# Patient Record
Sex: MALE | Race: WHITE | NOT HISPANIC OR LATINO
[De-identification: names, ages, dates, MRNs, and addresses within clinical notes are randomized per-mention and may not be internally consistent; named-entity substitution may affect disease eponyms.]

---

## 2023-12-14 ENCOUNTER — NON-APPOINTMENT (OUTPATIENT)
Age: 33
End: 2023-12-14

## 2024-06-02 ENCOUNTER — EMERGENCY (EMERGENCY)
Facility: HOSPITAL | Age: 34
LOS: 1 days | Discharge: HOME | End: 2024-06-02
Attending: STUDENT IN AN ORGANIZED HEALTH CARE EDUCATION/TRAINING PROGRAM | Admitting: STUDENT IN AN ORGANIZED HEALTH CARE EDUCATION/TRAINING PROGRAM
Payer: COMMERCIAL

## 2024-06-02 VITALS
DIASTOLIC BLOOD PRESSURE: 83 MMHG | WEIGHT: 229.94 LBS | HEART RATE: 111 BPM | OXYGEN SATURATION: 98 % | TEMPERATURE: 98 F | SYSTOLIC BLOOD PRESSURE: 138 MMHG | RESPIRATION RATE: 17 BRPM | HEIGHT: 72 IN

## 2024-06-02 PROCEDURE — 12004 RPR S/N/AX/GEN/TRK7.6-12.5CM: CPT

## 2024-06-02 PROCEDURE — 73610 X-RAY EXAM OF ANKLE: CPT

## 2024-06-02 PROCEDURE — 99283 EMERGENCY DEPT VISIT LOW MDM: CPT | Mod: 25

## 2024-06-02 PROCEDURE — 90471 IMMUNIZATION ADMIN: CPT

## 2024-06-02 PROCEDURE — 90715 TDAP VACCINE 7 YRS/> IM: CPT

## 2024-06-02 PROCEDURE — 73610 X-RAY EXAM OF ANKLE: CPT | Mod: 26,RT

## 2024-06-02 PROCEDURE — 99284 EMERGENCY DEPT VISIT MOD MDM: CPT | Mod: 25

## 2024-06-02 RX ORDER — LIDOCAINE HYDROCHLORIDE AND EPINEPHRINE 10; 10 MG/ML; UG/ML
10 INJECTION, SOLUTION INFILTRATION; PERINEURAL ONCE
Refills: 0 | Status: COMPLETED | OUTPATIENT
Start: 2024-06-02 | End: 2024-06-02

## 2024-06-02 RX ORDER — TETANUS TOXOID, REDUCED DIPHTHERIA TOXOID AND ACELLULAR PERTUSSIS VACCINE, ADSORBED 5; 2.5; 8; 8; 2.5 [IU]/.5ML; [IU]/.5ML; UG/.5ML; UG/.5ML; UG/.5ML
0.5 SUSPENSION INTRAMUSCULAR ONCE
Refills: 0 | Status: COMPLETED | OUTPATIENT
Start: 2024-06-02 | End: 2024-06-02

## 2024-06-02 RX ORDER — IBUPROFEN 200 MG
600 TABLET ORAL ONCE
Refills: 0 | Status: COMPLETED | OUTPATIENT
Start: 2024-06-02 | End: 2024-06-02

## 2024-06-02 RX ADMIN — Medication 600 MILLIGRAM(S): at 18:06

## 2024-06-02 RX ADMIN — LIDOCAINE HYDROCHLORIDE AND EPINEPHRINE 10 MILLILITER(S): 10; 10 INJECTION, SOLUTION INFILTRATION; PERINEURAL at 18:14

## 2024-06-02 RX ADMIN — TETANUS TOXOID, REDUCED DIPHTHERIA TOXOID AND ACELLULAR PERTUSSIS VACCINE, ADSORBED 0.5 MILLILITER(S): 5; 2.5; 8; 8; 2.5 SUSPENSION INTRAMUSCULAR at 18:06

## 2024-06-02 RX ADMIN — Medication 1 TABLET(S): at 18:06

## 2024-06-02 NOTE — ED PROVIDER NOTE - PATIENT PORTAL LINK FT
You can access the FollowMyHealth Patient Portal offered by [facility] by registering at the following website: [email or web address]. By joining [employer]’s FollowMyHealth portal, you will also be able to view your health information using other applications (apps) compatible with our system.

## 2024-06-02 NOTE — ED PROVIDER NOTE - NSFOLLOWUPINSTRUCTIONS_ED_ALL_ED_FT
Chart(s)/Patient Keep dry x 24 hours then gently cleanse.  Do not submerge under water.  Use crutches to keep weight off right foot.  Rest. Ice. Compression. Elevation.  Take bactrim as prescribed.  Return in 10-14 days for suture removal, sooner for worsening or concerning symptoms.      Laceration Care, Adult  A laceration is a cut that may go through all layers of the skin and into the tissue that is right under the skin. Some lacerations heal on their own. Others need to be closed with stitches (sutures), staples, skin adhesive strips, or skin glue.    Proper care of a laceration reduces the risk for infection, helps the laceration heal better, and may prevent scarring.    General tips  Keep the wound clean and dry.  Do not scratch or pick at the wound.  Wash your hands with soap and water for at least 20 seconds before and after touching your wound or changing your bandage (dressing). If soap and water are not available, use hand .  Do not usedisinfectants or antiseptics, such as rubbing alcohol, to clean your wound unless told by your health care provider.  If you were given a dressing, you should change it at least once a day, or as told by your health care provider. You should also change it if it becomes wet or dirty.  How to care for your laceration  If sutures or staples were used:    Keep the wound completely dry for the first 24 hours, or as told by your health care provider. After that time, you may shower or bathe. Do not soak your wound in water until after the sutures or staples have been removed.  Clean the wound once each day, or as told by your health care provider. To do this:  Wash the wound with soap and water.  Rinse the wound with water to remove all soap.  Pat the wound dry with a clean towel. Do not rub the wound.  After cleaning the wound, apply a thin layer of antibiotic ointment, other topical ointments, or a non-adherent dressing as told by your health care provider. This will help prevent infection and keep the dressing from sticking to the wound.  Have the sutures or staples removed as told by your health care provider. Do not remove sutures or staples yourself.  If skin adhesive strips were used:    Do not get the skin adhesive strips wet. You may shower or bathe, but keep the wound dry.  If the wound gets wet, pat it dry with a clean towel. Do not rub the wound.  Skin adhesive strips fall off on their own. If adhesive strip edges start to loosen and curl up, you may trim the loose edges. Do not remove adhesive strips completely unless your health care provider tells you to do that.  If skin glue was used:    You may shower or bathe, but try to keep the wound dry. Do not soak the wound in water.  After showering or bathing, pat the wound dry with a clean towel. Do not rub the wound.  Do not do any activities that will make you sweat a lot until the skin glue has fallen off.  Do not apply liquid, cream, or ointment medicine to the wound while the skin glue is in place. Doing this may loosen the film before the wound has healed.  If a dressing is placed over the wound, do not apply tape directly over the skin glue. Doing this may cause the glue to be pulled off before the wound has healed.  Do not pick at the glue. Skin glue usually remains in place for 5–10 days and then falls off the skin.  Follow these instructions at home:  Medicines    Take over-the-counter and prescription medicines only as told by your health care provider.  If you were prescribed an antibiotic medicine or ointment, take or apply it as told by your health care provider. Do not stop using it even if your condition improves.  Managing pain and swelling    If directed, put ice on the injured area. To do this:  Put ice in a plastic bag.  Place a towel between your skin and the bag.  Leave the ice on for 20 minutes, 2–3 times a day.  Remove the ice if your skin turns bright red. This is very important. If you cannot feel pain, heat, or cold, you have a greater risk of damage to the area.  Raise (elevate) the injured area above the level of your heart while you are sitting or lying down for the first 24–48 hours after the laceration is repaired.  General instructions    Two wounds closed with skin glue. One is normal. The other is red with pus and infected.  Avoid any activity that could cause your wound to reopen.  Check your wound every day for signs of infection. Watch for:  More redness, swelling, or pain.  Fluid or blood.  Warmth.  Pus or a bad smescobar.  Keep all follow-up visits. This is important.  Contact a health care provider if:  You received a tetanus shot and you have swelling, severe pain, redness, or bleeding at the injection site.  Your closed wound breaks open.  You have any of these signs of infection:  More redness, swelling, or pain around your wound.  Fluid or blood coming from your wound.  Warmth coming from your wound.  Pus or a bad smell coming from your wound.  A fever.  You notice something coming out of the wound, such as wood or glass.  Your pain is not controlled with medicine.  You notice a change in the color of your skin near your wound.  You need to change the dressing often.  You develop a new rash.  You have numbness around the wound.  Get help right away if:  You develop severe swelling around the wound.  Your pain suddenly increases and is severe.  You develop painful lumps near the wound or on skin anywhere else on your body.  You have a red streak going away from your wound.  The wound is on your hand or foot, and you cannot properly move a finger or toe.  The wound is on your hand or foot, and you notice that your fingers or toes look pale or bluish.  Summary  A laceration is a cut that may go through all layers of the skin and into the tissue that is right under the skin.  Some lacerations heal on their own. Others need to be closed with stitches (sutures), staples, skin adhesive strips, or skin glue.  Proper care of a laceration reduces the risk of infection, helps the laceration heal better, and may prevent scarring.  This information is not intended to replace advice given to you by your health care provider. Make sure you discuss any questions you have with your health care provider.

## 2024-06-02 NOTE — ED PROVIDER NOTE - CLINICAL SUMMARY MEDICAL DECISION MAKING FREE TEXT BOX
here with ankle / foot laceration. no twisting type injury. differential diagnosis inclusive of laceration, simple v complex, fracture, strain/sprain. x-ray, tdap, laceration repair.

## 2024-06-02 NOTE — ED ADULT NURSE NOTE - OBJECTIVE STATEMENT
pt to er has a wound to outer right foot from falling on an object today moderate amount of bleeding from wound bleeding stopped in er after dressing removed xrays done pts family at bedside General
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
## 2024-06-02 NOTE — ED ADULT NURSE NOTE - NSFALLUNIVINTERV_ED_ALL_ED
Bed/Stretcher in lowest position, wheels locked, appropriate side rails in place/Call bell, personal items and telephone in reach/Instruct patient to call for assistance before getting out of bed/chair/stretcher/Non-slip footwear applied when patient is off stretcher/[place] to call system/Physically safe environment - no spills, clutter or unnecessary equipment/Purposeful proactive rounding/Room/bathroom lighting operational, light cord in reach

## 2024-06-02 NOTE — ED PROVIDER NOTE - ATTENDING APP SHARED VISIT CONTRIBUTION OF CARE
This was a shared visit with KENJI. I reviewed and verified the documentation and independently performed the documented MDM.

## 2024-06-02 NOTE — ED ADULT NURSE NOTE - CCCP TRG CHIEF CMPLNT
Problem: Pain:  Goal: Pain level will decrease  Description: Pain level will decrease  12/12/2021 0525 by Latasha Gee RN  Outcome: Ongoing  12/11/2021 1842 by Prosper Lopez RN  Outcome: Ongoing  Goal: Control of acute pain  Description: Control of acute pain  12/12/2021 0525 by Latasha Gee RN  Outcome: Ongoing  12/11/2021 1842 by Prosper Lopez RN  Outcome: Ongoing  Goal: Control of chronic pain  Description: Control of chronic pain  12/12/2021 0525 by Latasha Gee RN  Outcome: Ongoing  12/11/2021 1842 by Prosper Lopez RN  Outcome: Ongoing     Problem:  Activity:  Goal: Ability to tolerate increased activity will improve  Description: Ability to tolerate increased activity will improve  12/12/2021 0525 by Latasha Gee RN  Outcome: Ongoing  12/11/2021 1842 by Prosper Lopez RN  Outcome: Ongoing     Problem: Falls - Risk of:  Goal: Will remain free from falls  Description: Will remain free from falls  12/12/2021 0525 by Latasha Gee RN  Outcome: Ongoing  12/11/2021 1842 by Prosper Lopez RN  Outcome: Met This Shift  Goal: Absence of physical injury  Description: Absence of physical injury  12/12/2021 0525 by Latasha Gee RN  Outcome: Ongoing  12/11/2021 1842 by Prosper Lopez RN  Outcome: Met This Shift     Problem: Musculor/Skeletal Functional Status  Goal: Highest potential functional level  12/12/2021 0525 by Latasha Gee RN  Outcome: Ongoing  12/11/2021 1842 by Prosper Lopez RN  Outcome: Ongoing     Problem: Skin Integrity:  Goal: Will show no infection signs and symptoms  Description: Will show no infection signs and symptoms  12/12/2021 0525 by Latasha Gee RN  Outcome: Ongoing  12/11/2021 1842 by Prosper Lopez RN  Outcome: Ongoing  Goal: Absence of new skin breakdown  Description: Absence of new skin breakdown  12/12/2021 0525 by Latasha Gee RN  Outcome: Ongoing  12/11/2021 1842 by Prosper Lopez RN  Outcome: Ongoing lacerations

## 2024-06-02 NOTE — ED PROCEDURE NOTE - CPROC ED TIME OUT STATEMENT1
“Patient's name, , procedure and correct site were confirmed during the [place] Timeout.”
“Patient's name, , procedure and correct site were confirmed during the [place] Timeout.”
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
## 2024-06-11 PROBLEM — Z00.00 ENCOUNTER FOR PREVENTIVE HEALTH EXAMINATION: Status: ACTIVE | Noted: 2024-06-11

## 2024-06-12 ENCOUNTER — OUTPATIENT (OUTPATIENT)
Dept: OUTPATIENT SERVICES | Facility: HOSPITAL | Age: 34
LOS: 1 days | Discharge: HOME | End: 2024-06-12
Payer: COMMERCIAL

## 2024-06-12 ENCOUNTER — APPOINTMENT (OUTPATIENT)
Dept: WOUND CARE | Facility: HOSPITAL | Age: 34
End: 2024-06-12

## 2024-06-12 VITALS
BODY MASS INDEX: 30.48 KG/M2 | HEIGHT: 73 IN | TEMPERATURE: 98.3 F | RESPIRATION RATE: 18 BRPM | HEART RATE: 76 BPM | SYSTOLIC BLOOD PRESSURE: 124 MMHG | DIASTOLIC BLOOD PRESSURE: 72 MMHG | OXYGEN SATURATION: 99 % | WEIGHT: 230 LBS

## 2024-06-12 DIAGNOSIS — S91.011A LACERATION W/OUT FOREIGN BODY, RIGHT ANKLE, INITIAL ENCOUNTER: ICD-10-CM

## 2024-06-12 DIAGNOSIS — S60.551A SUPERFICIAL FOREIGN BODY OF RIGHT HAND, INITIAL ENCOUNTER: ICD-10-CM

## 2024-06-12 DIAGNOSIS — Z83.3 FAMILY HISTORY OF DIABETES MELLITUS: ICD-10-CM

## 2024-06-12 DIAGNOSIS — Z87.39 PERSONAL HISTORY OF OTHER DISEASES OF THE MUSCULOSKELETAL SYSTEM AND CONNECTIVE TISSUE: ICD-10-CM

## 2024-06-12 DIAGNOSIS — Z78.9 OTHER SPECIFIED HEALTH STATUS: ICD-10-CM

## 2024-06-12 DIAGNOSIS — S91.019A LACERATION WITHOUT FOREIGN BODY, UNSPECIFIED ANKLE, INITIAL ENCOUNTER: ICD-10-CM

## 2024-06-12 PROBLEM — R56.9 UNSPECIFIED CONVULSIONS: Chronic | Status: ACTIVE | Noted: 2024-06-02

## 2024-06-12 PROCEDURE — 99203 OFFICE O/P NEW LOW 30 MIN: CPT

## 2024-06-12 PROCEDURE — G0463: CPT

## 2024-06-16 DIAGNOSIS — Z88.8 ALLERGY STATUS TO OTHER DRUGS, MEDICAMENTS AND BIOLOGICAL SUBSTANCES: ICD-10-CM

## 2024-06-16 DIAGNOSIS — Z88.0 ALLERGY STATUS TO PENICILLIN: ICD-10-CM

## 2024-06-16 DIAGNOSIS — Z83.3 FAMILY HISTORY OF DIABETES MELLITUS: ICD-10-CM
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
## 2024-06-16 DIAGNOSIS — Z90.49 ACQUIRED ABSENCE OF OTHER SPECIFIED PARTS OF DIGESTIVE TRACT: ICD-10-CM
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
## 2024-06-16 DIAGNOSIS — S91.011D LACERATION WITHOUT FOREIGN BODY, RIGHT ANKLE, SUBSEQUENT ENCOUNTER: ICD-10-CM
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
## 2024-06-16 NOTE — ASSESSMENT
[Verbal] : Verbal [Demo] : Demo [Patient] : Patient [Spouse] : Spouse [Good - alert, interested, motivated] : Good - alert, interested, motivated [Demonstrates independently] : demonstrates independently [Dressing changes] : dressing changes [Foot Care] : foot care [Skin Care] : skin care [Signs and symptoms of infection] : sign and symptoms of infection [Nutrition] : nutrition [How and When to Call] : how and when to call [Off-loading] : off-loading [Patient responsibility to plan of care] : patient responsibility to plan of care [] : Yes [Stable] : stable [Home] : Home [Ambulatory] : Ambulatory [Not Applicable - Long Term Care/Home Health Agency] : Long Term Care/Home Health Agency: Not Applicable [FreeTextEntry2] : Infection Prevention Foot and nail care Nutrition and wound healing Pt Demonstrates use of both nonpharmacological and pharmacological pain relief strategies. [FreeTextEntry3] : Initial visit  Medications reconciled.  [FreeTextEntry4] : Patient does not have a PCP - Patient provided with physician access line Patient takes no oral medication - Only multi vitamin men's  No vascular studies ordered. 32 Y/O M 2+ Palpable pulses with no history.  Patient independent with dressing changes **DRESSING TO STAY DRY AND INTACT X 1 WEEK **  F/U 1 Week

## 2024-06-16 NOTE — PLAN
[FreeTextEntry1] : .Removed few sutures, skin flap not co -opted. Remaining sutures left intact. C/w local wound care and offloading. RTc in one week. Spent 30 minutes for patient care and medical decision making.
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
## 2024-06-16 NOTE — VITALS
[Pain related to present condition?] : The patient's  pain is related to present condition. [Burning] : burning [] : Yes [de-identified] : 3/10 [FreeTextEntry3] : Right Ankle Laceration [FreeTextEntry1] : Elevation [FreeTextEntry2] : Lower leg depression [FreeTextEntry4] : Wound elevated during assessment and treatment. [FreeTextEntry5] : Initial Visit - Medications reconciled

## 2024-06-16 NOTE — PHYSICAL EXAM
[4 x 4] : 4 x 4  [Abdominal Pad] : Abdominal Pad [2+] : left 2+ [Ankle Swelling (On Exam)] : present [Ankle Swelling On The Right] : mild [Alert] : alert [Oriented to Person] : oriented to person [Oriented to Place] : oriented to place [de-identified] : AOX3, NAD  [de-identified] : Deferred ankle ROM to the right secondary to laceration [de-identified] : Right lateral ankle wound with sutures intact, mild edema and erythema, no drainage  [de-identified] : Every other suture removed. [FreeTextEntry1] : Right Ankle Laceration 06/02/2024 [FreeTextEntry2] : 1.2 [FreeTextEntry3] : 5.1 [de-identified] : none [FreeTextEntry4] : 0.1 x suture line [de-identified] : none [de-identified] : traumatic laceration [de-identified] : none [de-identified] : mild erythema [de-identified] : none [de-identified] : 100 % Flap [de-identified] : unable to visualize base [de-identified] : none [de-identified] : none [de-identified] : Betadine soaked adaptic touch [de-identified] : CIRCULATION  Dorsalis Pedis: R Palpable, Regular, 2+ L Palpable, Regular, 2+ Posterior Tibialis: R Palpable, Regular, 2+ L Palpable, Regular, 2+ Extremity Color: Pigmented Extremity Temperature: Warm Capillary Refill: < 3 seconds bilaterally  [de-identified] : Weekly [de-identified] : Primary Dressing

## 2024-06-19 ENCOUNTER — APPOINTMENT (OUTPATIENT)
Dept: WOUND CARE | Facility: HOSPITAL | Age: 34
End: 2024-06-19
Payer: COMMERCIAL

## 2024-06-19 ENCOUNTER — OUTPATIENT (OUTPATIENT)
Dept: OUTPATIENT SERVICES | Facility: HOSPITAL | Age: 34
LOS: 1 days | Discharge: HOME | End: 2024-06-19
Payer: COMMERCIAL

## 2024-06-19 VITALS
DIASTOLIC BLOOD PRESSURE: 68 MMHG | HEART RATE: 77 BPM | HEIGHT: 73 IN | OXYGEN SATURATION: 98 % | WEIGHT: 230 LBS | BODY MASS INDEX: 30.48 KG/M2 | TEMPERATURE: 97.4 F | SYSTOLIC BLOOD PRESSURE: 107 MMHG | RESPIRATION RATE: 14 BRPM

## 2024-06-19 DIAGNOSIS — S91.011D LACERATION W/OUT FOREIGN BODY, RIGHT ANKLE, SUBSEQUENT ENCOUNTER: ICD-10-CM

## 2024-06-19 DIAGNOSIS — S91.309A UNSPECIFIED OPEN WOUND, UNSPECIFIED FOOT, INITIAL ENCOUNTER: ICD-10-CM

## 2024-06-19 PROCEDURE — G0463: CPT

## 2024-06-19 PROCEDURE — 99213 OFFICE O/P EST LOW 20 MIN: CPT

## 2024-06-23 PROBLEM — S91.011D: Status: ACTIVE | Noted: 2024-06-16

## 2024-06-23 NOTE — PHYSICAL EXAM
[4 x 4] : 4 x 4  [2+] : left 2+ [Ankle Swelling (On Exam)] : present [Ankle Swelling On The Right] : mild [Alert] : alert [Oriented to Person] : oriented to person [Oriented to Place] : oriented to place [de-identified] : AOX3, NAD  [de-identified] : Deferred ankle ROM to the right secondary to laceration [de-identified] : Right lateral ankle wound- removed sutures dehiscence noted to the lateral wound down to fat with mild erythema, no drainage, no proximal streaking, no signs of infection  [FreeTextEntry1] : Right ankle laceration - 6/2/24 - Sutures intact (Measurements measured after sutures removed. ) [FreeTextEntry2] : 1.7 [FreeTextEntry3] : 2.0 [FreeTextEntry4] : 0.3 [de-identified] : Serous/sanguinous [de-identified] : 5% [de-identified] : Silver alginate [de-identified] : Mechanically cleansed with sterile gauze and normal saline. Kerlix    * Sutures removed by DPM  [TWNoteComboBox4] : Small [TWNoteComboBox6] : Traumatic [de-identified] : Normal [de-identified] : None [de-identified] : None [de-identified] : >75% [de-identified] : Yes [de-identified] : Every other day [de-identified] : Primary Dressing

## 2024-06-23 NOTE — PLAN
[FreeTextEntry1] : .Removed all sutures to  skin flap not co -opted. Dehiscence noted to the wound down to fat. C/w local wound care and offloading. RTC in one week. Spent 30 minutes for patient care and medical decision making.

## 2024-06-23 NOTE — VITALS
[Pain related to present condition?] : The patient's  pain is related to present condition. [Gnawing] : gnawing [Occasional] : occasional [de-identified] : 3/10  [] : No [FreeTextEntry3] : Right ankle  [FreeTextEntry1] : Offloading  [FreeTextEntry2] : Frequent ambulation

## 2024-06-23 NOTE — HISTORY OF PRESENT ILLNESS
[FreeTextEntry1] : Patient is 33 year old male presenting for f/u s/p traumatic wound to the right lateral ankle down to fat and also lateral heel wound down to fat.

## 2024-06-23 NOTE — ASSESSMENT
[Verbal] : Verbal [Written] : Written [Demo] : Demo [Patient] : Patient [Good - alert, interested, motivated] : Good - alert, interested, motivated [Verbalizes knowledge/Understanding] : Verbalizes knowledge/understanding [Dressing changes] : dressing changes [Foot Care] : foot care [Skin Care] : skin care [Signs and symptoms of infection] : sign and symptoms of infection [Surgery] : surgery [How and When to Call] : how and when to call [Pain Management] : pain management [Off-loading] : off-loading [Patient responsibility to plan of care] : patient responsibility to plan of care [Stable] : stable [Home] : Home [Ambulatory] : Ambulatory [Not Applicable - Long Term Care/Home Health Agency] : Long Term Care/Home Health Agency: Not Applicable [] : No [FreeTextEntry2] : Infection prevention Localized wound care  Goal remaining pain free regarding wounds Offloading  [FreeTextEntry4] : Patient preforms his own dressing changes. Supply order placed.  Follow up in 1 week

## 2024-06-23 NOTE — REVIEW OF SYSTEMS
[Skin Wound] : skin wound [Negative] : Endocrine [de-identified] : Right lateral heel and ankle wound down to fat
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
## 2024-06-24 DIAGNOSIS — Z90.49 ACQUIRED ABSENCE OF OTHER SPECIFIED PARTS OF DIGESTIVE TRACT: ICD-10-CM

## 2024-06-24 DIAGNOSIS — S91.011D LACERATION WITHOUT FOREIGN BODY, RIGHT ANKLE, SUBSEQUENT ENCOUNTER: ICD-10-CM
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
## 2024-06-24 DIAGNOSIS — Z88.8 ALLERGY STATUS TO OTHER DRUGS, MEDICAMENTS AND BIOLOGICAL SUBSTANCES: ICD-10-CM

## 2024-06-24 DIAGNOSIS — Z88.0 ALLERGY STATUS TO PENICILLIN: ICD-10-CM

## 2024-06-24 DIAGNOSIS — Z83.3 FAMILY HISTORY OF DIABETES MELLITUS: ICD-10-CM
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
## 2024-06-26 ENCOUNTER — APPOINTMENT (OUTPATIENT)
Dept: WOUND CARE | Facility: HOSPITAL | Age: 34
End: 2024-06-26
Payer: COMMERCIAL

## 2024-06-26 ENCOUNTER — OUTPATIENT (OUTPATIENT)
Dept: OUTPATIENT SERVICES | Facility: HOSPITAL | Age: 34
LOS: 1 days | Discharge: HOME | End: 2024-06-26
Payer: COMMERCIAL

## 2024-06-26 VITALS
BODY MASS INDEX: 30.48 KG/M2 | RESPIRATION RATE: 18 BRPM | DIASTOLIC BLOOD PRESSURE: 79 MMHG | SYSTOLIC BLOOD PRESSURE: 125 MMHG | HEIGHT: 73 IN | HEART RATE: 94 BPM | TEMPERATURE: 98.4 F | WEIGHT: 230 LBS | OXYGEN SATURATION: 97 %

## 2024-06-26 DIAGNOSIS — S91.011D LACERATION WITHOUT FOREIGN BODY, RIGHT ANKLE, SUBSEQUENT ENCOUNTER: ICD-10-CM
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
## 2024-06-26 DIAGNOSIS — Z90.49 ACQUIRED ABSENCE OF OTHER SPECIFIED PARTS OF DIGESTIVE TRACT: ICD-10-CM

## 2024-06-26 DIAGNOSIS — S91.019A LACERATION WITHOUT FOREIGN BODY, UNSPECIFIED ANKLE, INITIAL ENCOUNTER: ICD-10-CM

## 2024-06-26 DIAGNOSIS — Z88.0 ALLERGY STATUS TO PENICILLIN: ICD-10-CM
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
## 2024-06-26 DIAGNOSIS — Z83.3 FAMILY HISTORY OF DIABETES MELLITUS: ICD-10-CM

## 2024-06-26 DIAGNOSIS — Z88.8 ALLERGY STATUS TO OTHER DRUGS, MEDICAMENTS AND BIOLOGICAL SUBSTANCES: ICD-10-CM

## 2024-06-26 PROCEDURE — 99213 OFFICE O/P EST LOW 20 MIN: CPT

## 2024-06-26 PROCEDURE — G0463: CPT

## 2024-07-03 ENCOUNTER — OUTPATIENT (OUTPATIENT)
Dept: OUTPATIENT SERVICES | Facility: HOSPITAL | Age: 34
LOS: 1 days | Discharge: HOME | End: 2024-07-03
Payer: COMMERCIAL

## 2024-07-03 ENCOUNTER — APPOINTMENT (OUTPATIENT)
Dept: WOUND CARE | Facility: HOSPITAL | Age: 34
End: 2024-07-03
Payer: COMMERCIAL

## 2024-07-03 VITALS
BODY MASS INDEX: 30.48 KG/M2 | WEIGHT: 230 LBS | TEMPERATURE: 98.9 F | RESPIRATION RATE: 18 BRPM | DIASTOLIC BLOOD PRESSURE: 66 MMHG | HEIGHT: 73 IN | HEART RATE: 77 BPM | SYSTOLIC BLOOD PRESSURE: 112 MMHG | OXYGEN SATURATION: 96 %

## 2024-07-03 DIAGNOSIS — S91.019A LACERATION WITHOUT FOREIGN BODY, UNSPECIFIED ANKLE, INITIAL ENCOUNTER: ICD-10-CM

## 2024-07-03 PROCEDURE — G0463: CPT

## 2024-07-03 PROCEDURE — 99213 OFFICE O/P EST LOW 20 MIN: CPT

## 2024-07-04 DIAGNOSIS — Z88.8 ALLERGY STATUS TO OTHER DRUGS, MEDICAMENTS AND BIOLOGICAL SUBSTANCES: ICD-10-CM

## 2024-07-04 DIAGNOSIS — Z80.0 FAMILY HISTORY OF MALIGNANT NEOPLASM OF DIGESTIVE ORGANS: ICD-10-CM

## 2024-07-04 DIAGNOSIS — Z83.3 FAMILY HISTORY OF DIABETES MELLITUS: ICD-10-CM

## 2024-07-04 DIAGNOSIS — Z90.49 ACQUIRED ABSENCE OF OTHER SPECIFIED PARTS OF DIGESTIVE TRACT: ICD-10-CM
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
## 2024-07-04 DIAGNOSIS — S91.011D LACERATION WITHOUT FOREIGN BODY, RIGHT ANKLE, SUBSEQUENT ENCOUNTER: ICD-10-CM
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
## 2024-07-10 ENCOUNTER — APPOINTMENT (OUTPATIENT)
Dept: WOUND CARE | Facility: HOSPITAL | Age: 34
End: 2024-07-10
Payer: COMMERCIAL

## 2024-07-10 ENCOUNTER — OUTPATIENT (OUTPATIENT)
Dept: OUTPATIENT SERVICES | Facility: HOSPITAL | Age: 34
LOS: 1 days | Discharge: HOME | End: 2024-07-10
Payer: COMMERCIAL

## 2024-07-10 VITALS
HEIGHT: 73 IN | TEMPERATURE: 98.1 F | OXYGEN SATURATION: 98 % | SYSTOLIC BLOOD PRESSURE: 113 MMHG | WEIGHT: 230 LBS | DIASTOLIC BLOOD PRESSURE: 72 MMHG | RESPIRATION RATE: 18 BRPM | HEART RATE: 78 BPM | BODY MASS INDEX: 30.48 KG/M2

## 2024-07-10 DIAGNOSIS — S91.019D: ICD-10-CM

## 2024-07-10 PROCEDURE — G0463: CPT

## 2024-07-10 PROCEDURE — 99213 OFFICE O/P EST LOW 20 MIN: CPT

## 2024-07-11 DIAGNOSIS — Z90.49 ACQUIRED ABSENCE OF OTHER SPECIFIED PARTS OF DIGESTIVE TRACT: ICD-10-CM

## 2024-07-11 DIAGNOSIS — Z88.8 ALLERGY STATUS TO OTHER DRUGS, MEDICAMENTS AND BIOLOGICAL SUBSTANCES: ICD-10-CM

## 2024-07-11 DIAGNOSIS — S91.011D LACERATION WITHOUT FOREIGN BODY, RIGHT ANKLE, SUBSEQUENT ENCOUNTER: ICD-10-CM

## 2024-07-11 DIAGNOSIS — Z83.3 FAMILY HISTORY OF DIABETES MELLITUS: ICD-10-CM
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
## 2024-07-11 DIAGNOSIS — Z88.0 ALLERGY STATUS TO PENICILLIN: ICD-10-CM

## 2024-07-17 ENCOUNTER — APPOINTMENT (OUTPATIENT)
Dept: WOUND CARE | Facility: HOSPITAL | Age: 34
End: 2024-07-17
Payer: COMMERCIAL

## 2024-07-17 ENCOUNTER — OUTPATIENT (OUTPATIENT)
Dept: OUTPATIENT SERVICES | Facility: HOSPITAL | Age: 34
LOS: 1 days | Discharge: HOME | End: 2024-07-17
Payer: COMMERCIAL

## 2024-07-17 VITALS
DIASTOLIC BLOOD PRESSURE: 71 MMHG | SYSTOLIC BLOOD PRESSURE: 113 MMHG | BODY MASS INDEX: 30.48 KG/M2 | OXYGEN SATURATION: 97 % | WEIGHT: 230 LBS | TEMPERATURE: 98.1 F | HEIGHT: 73 IN | RESPIRATION RATE: 18 BRPM | HEART RATE: 71 BPM

## 2024-07-17 DIAGNOSIS — S91.019D: ICD-10-CM

## 2024-07-17 PROCEDURE — 99213 OFFICE O/P EST LOW 20 MIN: CPT

## 2024-07-17 PROCEDURE — G0463: CPT
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
## 2024-07-18 DIAGNOSIS — S91.011D LACERATION WITHOUT FOREIGN BODY, RIGHT ANKLE, SUBSEQUENT ENCOUNTER: ICD-10-CM

## 2024-07-18 DIAGNOSIS — Z83.3 FAMILY HISTORY OF DIABETES MELLITUS: ICD-10-CM

## 2024-07-18 DIAGNOSIS — Z88.0 ALLERGY STATUS TO PENICILLIN: ICD-10-CM
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
## 2024-07-18 DIAGNOSIS — Z90.49 ACQUIRED ABSENCE OF OTHER SPECIFIED PARTS OF DIGESTIVE TRACT: ICD-10-CM

## 2024-07-18 DIAGNOSIS — Z88.8 ALLERGY STATUS TO OTHER DRUGS, MEDICAMENTS AND BIOLOGICAL SUBSTANCES: ICD-10-CM
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
## 2024-07-24 ENCOUNTER — APPOINTMENT (OUTPATIENT)
Dept: WOUND CARE | Facility: HOSPITAL | Age: 34
End: 2024-07-24
Payer: COMMERCIAL

## 2024-07-24 VITALS
RESPIRATION RATE: 18 BRPM | HEART RATE: 83 BPM | WEIGHT: 230 LBS | TEMPERATURE: 98.2 F | SYSTOLIC BLOOD PRESSURE: 135 MMHG | DIASTOLIC BLOOD PRESSURE: 76 MMHG | BODY MASS INDEX: 30.48 KG/M2 | HEIGHT: 73 IN | OXYGEN SATURATION: 98 %

## 2024-07-24 DIAGNOSIS — S91.011D LACERATION W/OUT FOREIGN BODY, RIGHT ANKLE, SUBSEQUENT ENCOUNTER: ICD-10-CM

## 2024-07-24 PROCEDURE — 99213 OFFICE O/P EST LOW 20 MIN: CPT

## 2024-07-25 ENCOUNTER — NON-APPOINTMENT (OUTPATIENT)
Age: 34
End: 2024-07-25

## 2024-07-28 NOTE — PLAN
[FreeTextEntry1] : .Removed all sutures to  skin flap not co -opted. Dehiscence noted to the wound down to fat. C/w local wound care and offloading. Advised patient to limit ambulation and to alleviate the pressure from the area of the ankle. Advised patient to ice, elevate the right lower extremity. Will request auth for debridement vs delayed closure of the wound.  RTC in one week. Spent 20 minutes for patient care and medical decision making.

## 2024-07-28 NOTE — REVIEW OF SYSTEMS
[Skin Wound] : skin wound [Negative] : Endocrine [de-identified] : Right lateral heel and ankle wound down to fat

## 2024-07-28 NOTE — PHYSICAL EXAM
[4 x 4] : 4 x 4  [2+] : left 2+ [Ankle Swelling (On Exam)] : present [Ankle Swelling On The Right] : mild [Alert] : alert [Oriented to Person] : oriented to person [Oriented to Place] : oriented to place [de-identified] : AOX3, NAD  [de-identified] : Deferred ankle ROM to the right secondary to laceration [de-identified] : Right lateral ankle wound- dehiscence noted to the lateral wound down to fat with no more erythema , no drainage, no proximal streaking, no signs of infection ; right heel wound- healed  [FreeTextEntry1] : Right ankle - crusting/scab  [FreeTextEntry2] : 0.7 [FreeTextEntry3] : 2.7 [FreeTextEntry4] : 0.1 [de-identified] : Mild  [de-identified] : Silver alginate [de-identified] : Cleansed with 0.9% Normal Saline  Kerlix  [TWNoteComboBox4] : None [TWNoteComboBox5] : No [TWNoteComboBox6] : Traumatic [de-identified] : No [de-identified] : Erythema [de-identified] : None [de-identified] : None [de-identified] : None [de-identified] : No [de-identified] : Every other day [de-identified] : Primary Dressing

## 2024-07-28 NOTE — ASSESSMENT
[FreeTextEntry2] : Infection prevention Localized wound care Promote optimal skin integrity  Maintain acceptable level of pain with use of pharmacological and nonpharmacological interventions Offloading / Pressure relief  [FreeTextEntry4] : Follow up for an assessment in one week Authorization was submitted for delayed primary closure Patient's spouse is able to perform wound care at home.  Supplies were not sent to the patient yet; patient was provided with some supplies to prevent delay of care.

## 2024-07-28 NOTE — HISTORY OF PRESENT ILLNESS
[FreeTextEntry1] : Patient is 33 year old male presenting for f/u s/p traumatic wound to the right lateral ankle down to fat with slow progress of healing and also lateral heel wound down to fat - healed with no signs of infection. Patient yakes help from his spouse for dressing changes.

## 2024-07-28 NOTE — REVIEW OF SYSTEMS
[Skin Wound] : skin wound [Negative] : Endocrine [de-identified] : Right lateral heel and ankle wound down to fat

## 2024-07-28 NOTE — PHYSICAL EXAM
[4 x 4] : 4 x 4  [2+] : left 2+ [Ankle Swelling (On Exam)] : present [Ankle Swelling On The Right] : mild [Alert] : alert [Oriented to Person] : oriented to person [Oriented to Place] : oriented to place [de-identified] : AOX3, NAD  [de-identified] : Deferred ankle ROM to the right secondary to laceration [de-identified] : Right lateral ankle wound- dehiscence noted to the lateral wound down to fat with no more erythema , no drainage, no proximal streaking, no signs of infection ; right heel wound- healed  [FreeTextEntry1] : Right ankle - crusting/scab  [FreeTextEntry2] : 0.7 [FreeTextEntry3] : 2.7 [FreeTextEntry4] : 0.1 [de-identified] : Mild  [de-identified] : Silver alginate [de-identified] : Cleansed with 0.9% Normal Saline  Kerlix  [TWNoteComboBox4] : None [TWNoteComboBox5] : No [TWNoteComboBox6] : Traumatic [de-identified] : No [de-identified] : Erythema [de-identified] : None [de-identified] : None [de-identified] : None [de-identified] : No [de-identified] : Every other day [de-identified] : Primary Dressing

## 2024-07-31 ENCOUNTER — APPOINTMENT (OUTPATIENT)
Dept: WOUND CARE | Facility: HOSPITAL | Age: 34
End: 2024-07-31
Payer: COMMERCIAL

## 2024-07-31 ENCOUNTER — OUTPATIENT (OUTPATIENT)
Dept: OUTPATIENT SERVICES | Facility: HOSPITAL | Age: 34
LOS: 1 days | Discharge: HOME | End: 2024-07-31
Payer: COMMERCIAL

## 2024-07-31 VITALS
SYSTOLIC BLOOD PRESSURE: 129 MMHG | HEART RATE: 79 BPM | OXYGEN SATURATION: 97 % | WEIGHT: 230 LBS | TEMPERATURE: 97.7 F | RESPIRATION RATE: 18 BRPM | HEIGHT: 73 IN | DIASTOLIC BLOOD PRESSURE: 77 MMHG | BODY MASS INDEX: 30.48 KG/M2

## 2024-07-31 DIAGNOSIS — S91.119D: ICD-10-CM

## 2024-07-31 PROBLEM — T81.33XD: Status: ACTIVE | Noted: 2024-07-31

## 2024-07-31 PROCEDURE — 99213 OFFICE O/P EST LOW 20 MIN: CPT

## 2024-07-31 PROCEDURE — G0463: CPT

## 2024-07-31 NOTE — PLAN
[FreeTextEntry1] : .Removed all sutures to  skin flap not co -opted. Dehiscence noted to the wound down to fat. C/w local wound care and offloading.  RTC in one week. Spent 20 minutes for patient care and medical decision making.

## 2024-07-31 NOTE — REVIEW OF SYSTEMS
[Skin Wound] : skin wound [Negative] : Endocrine [de-identified] : Right lateral heel and ankle wound down to fat

## 2024-07-31 NOTE — PHYSICAL EXAM
[2 x 2] : 2 x 2  [2+] : left 2+ [Ankle Swelling (On Exam)] : present [Ankle Swelling On The Right] : mild [Alert] : alert [Oriented to Person] : oriented to person [Oriented to Place] : oriented to place [de-identified] : AOX3, NAD  [de-identified] : Deferred ankle ROM to the right secondary to laceration [de-identified] : Right lateral ankle wound- dehiscence noted to the lateral wound down to fat  noted with healing and with no more erythema , no drainage, no proximal streaking, no signs of infection ; right heel wound- healed  [FreeTextEntry1] : Right Lateral Ankle [FreeTextEntry2] : 0.5 [FreeTextEntry3] : 0.9 [FreeTextEntry4] : 0.1 [de-identified] : small sanguineous [de-identified] : none [de-identified] : traumatic [de-identified] : none [de-identified] : intact [de-identified] : none [de-identified] : none [de-identified] : 100% [de-identified] : none [de-identified] : none [de-identified] : Olga [de-identified] : Mechanically cleansed with sterile gauze and normal saline 0.9% Dry Dressing [de-identified] : Every other day [de-identified] : Primary Dressing

## 2024-07-31 NOTE — REVIEW OF SYSTEMS
[Skin Wound] : skin wound [Negative] : Endocrine [de-identified] : Right lateral heel and ankle wound down to fat

## 2024-07-31 NOTE — REVIEW OF SYSTEMS
[Skin Wound] : skin wound [Negative] : Endocrine [de-identified] : Right lateral heel and ankle wound down to fat

## 2024-07-31 NOTE — HISTORY OF PRESENT ILLNESS
[FreeTextEntry1] : Patient is 33 year old male presenting for f/u s/p traumatic wound to the right lateral ankle down to fat with slow progress of healing and also lateral heel wound down to fat - healed with no signs of infection. Patient has returned to work since this week and  denies any pain or discomfort to the lateral right ankle in the area of the wound.

## 2024-07-31 NOTE — PHYSICAL EXAM
[2 x 2] : 2 x 2  [2+] : left 2+ [Ankle Swelling (On Exam)] : present [Ankle Swelling On The Right] : mild [Alert] : alert [Oriented to Person] : oriented to person [Oriented to Place] : oriented to place [de-identified] : AOX3, NAD  [de-identified] : Deferred ankle ROM to the right secondary to laceration [de-identified] : Right lateral ankle wound- dehiscence noted to the lateral wound down to fat  noted with healing and with no more erythema , no drainage, no proximal streaking, no signs of infection ; right heel wound- healed  [FreeTextEntry1] : Right Lateral Ankle [FreeTextEntry2] : 0.5 [FreeTextEntry3] : 0.9 [FreeTextEntry4] : 0.1 [de-identified] : small sanguineous [de-identified] : none [de-identified] : traumatic [de-identified] : none [de-identified] : intact [de-identified] : none [de-identified] : none [de-identified] : 100% [de-identified] : none [de-identified] : none [de-identified] : Olga [de-identified] : Mechanically cleansed with sterile gauze and normal saline 0.9% Dry Dressing [de-identified] : Every other day [de-identified] : Primary Dressing

## 2024-07-31 NOTE — ASSESSMENT
[Verbal] : Verbal [Demo] : Demo [Patient] : Patient [Good - alert, interested, motivated] : Good - alert, interested, motivated [Demonstrates independently] : demonstrates independently [Dressing changes] : dressing changes [Foot Care] : foot care [Skin Care] : skin care [Signs and symptoms of infection] : sign and symptoms of infection [Nutrition] : nutrition [How and When to Call] : how and when to call [Off-loading] : off-loading [Patient responsibility to plan of care] : patient responsibility to plan of care [Stable] : stable [Home] : Home [Not Applicable - Long Term Care/Home Health Agency] : Long Term Care/Home Health Agency: Not Applicable [] : No [FreeTextEntry2] : Infection Prevention Foot and nail care Nutrition and wound healing Pt Demonstrates use of both nonpharmacological and pharmacological pain relief strategies. [FreeTextEntry4] : No DPC performed today Patient has returned to work x 3 days ago, with no restrictions. F/U 1 Week

## 2024-07-31 NOTE — PHYSICAL EXAM
[2 x 2] : 2 x 2  [2+] : left 2+ [Ankle Swelling (On Exam)] : present [Ankle Swelling On The Right] : mild [Alert] : alert [Oriented to Person] : oriented to person [Oriented to Place] : oriented to place [de-identified] : AOX3, NAD  [de-identified] : Deferred ankle ROM to the right secondary to laceration [de-identified] : Right lateral ankle wound- dehiscence noted to the lateral wound down to fat  noted with healing and with no more erythema , no drainage, no proximal streaking, no signs of infection ; right heel wound- healed  [FreeTextEntry1] : Right Lateral Ankle [FreeTextEntry2] : 0.5 [FreeTextEntry3] : 0.9 [FreeTextEntry4] : 0.1 [de-identified] : small sanguineous [de-identified] : none [de-identified] : traumatic [de-identified] : none [de-identified] : intact [de-identified] : none [de-identified] : none [de-identified] : 100% [de-identified] : none [de-identified] : none [de-identified] : Olga [de-identified] : Mechanically cleansed with sterile gauze and normal saline 0.9% Dry Dressing [de-identified] : Every other day [de-identified] : Primary Dressing

## 2024-08-04 DIAGNOSIS — T81.33XD DISRUPTION OF TRAUMATIC INJURY WOUND REPAIR, SUBSEQUENT ENCOUNTER: ICD-10-CM

## 2024-08-04 DIAGNOSIS — Z88.8 ALLERGY STATUS TO OTHER DRUGS, MEDICAMENTS AND BIOLOGICAL SUBSTANCES: ICD-10-CM

## 2024-08-04 DIAGNOSIS — Z87.39 PERSONAL HISTORY OF OTHER DISEASES OF THE MUSCULOSKELETAL SYSTEM AND CONNECTIVE TISSUE: ICD-10-CM

## 2024-08-04 DIAGNOSIS — Z90.49 ACQUIRED ABSENCE OF OTHER SPECIFIED PARTS OF DIGESTIVE TRACT: ICD-10-CM

## 2024-08-04 DIAGNOSIS — Z83.3 FAMILY HISTORY OF DIABETES MELLITUS: ICD-10-CM

## 2024-08-04 DIAGNOSIS — Z88.0 ALLERGY STATUS TO PENICILLIN: ICD-10-CM
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
## 2024-08-08 ENCOUNTER — APPOINTMENT (OUTPATIENT)
Dept: WOUND CARE | Facility: HOSPITAL | Age: 34
End: 2024-08-08

## 2024-08-08 ENCOUNTER — OUTPATIENT (OUTPATIENT)
Dept: OUTPATIENT SERVICES | Facility: HOSPITAL | Age: 34
LOS: 1 days | Discharge: HOME | End: 2024-08-08
Payer: COMMERCIAL

## 2024-08-08 DIAGNOSIS — S91.019A LACERATION WITHOUT FOREIGN BODY, UNSPECIFIED ANKLE, INITIAL ENCOUNTER: ICD-10-CM

## 2024-08-08 PROCEDURE — G0463: CPT

## 2024-08-08 PROCEDURE — 99213 OFFICE O/P EST LOW 20 MIN: CPT

## 2024-08-11 NOTE — PLAN
[FreeTextEntry1] : .Patient seen and evaluated. Discussed etiology and treatment plan. Patient verbalized understanding. Wounds healed with no sings of infection. RTC in three weeks. Spent 20 minutes for patient care and medical decision making.

## 2024-08-11 NOTE — PHYSICAL EXAM
[2 x 2] : 2 x 2  [2+] : left 2+ [Ankle Swelling (On Exam)] : present [Ankle Swelling On The Right] : mild [Alert] : alert [Oriented to Person] : oriented to person [Oriented to Place] : oriented to place [de-identified] : Deferred ankle ROM to the right secondary to laceration [de-identified] : AOX3, NAD  [de-identified] : Right lateral ankle wound- dehiscence noted to the lateral wound down to fat - healed with no edema, no erythema,  no drainage, no proximal streaking, no signs of infection ; right heel wound- healed  [FreeTextEntry1] : Right Lateral Ankle [FreeTextEntry2] : 0.2 [FreeTextEntry3] : 1.5 [FreeTextEntry4] : 0.1 [de-identified] : none [de-identified] : traumatic [de-identified] : none [de-identified] : intact [de-identified] : none [de-identified] : none [de-identified] : none [de-identified] : none [de-identified] : Olga [de-identified] : Mechanically cleansed with sterile gauze and normal saline 0.9% Dry Dressing  Scabbed over [TWNoteComboBox4] : None [de-identified] : Every other day [de-identified] : Primary Dressing

## 2024-08-11 NOTE — ASSESSMENT
[Verbal] : Verbal [Patient] : Patient [Good - alert, interested, motivated] : Good - alert, interested, motivated [Foot Care] : foot care [Signs and symptoms of infection] : sign and symptoms of infection [How and When to Call] : how and when to call [Patient responsibility to plan of care] : patient responsibility to plan of care [Stable] : stable [Home] : Home [Ambulatory] : Ambulatory [Not Applicable - Long Term Care/Home Health Agency] : Long Term Care/Home Health Agency: Not Applicable [] : No [FreeTextEntry3] : yes scabbed  [FreeTextEntry4] : May shower daily Return in 3 weeks

## 2024-08-11 NOTE — REVIEW OF SYSTEMS
[Skin Wound] : skin wound [Negative] : Endocrine [de-identified] : Right lateral heel and ankle wound down to fat

## 2024-08-11 NOTE — PHYSICAL EXAM
[2 x 2] : 2 x 2  [2+] : left 2+ [Ankle Swelling (On Exam)] : present [Alert] : alert [Ankle Swelling On The Right] : mild [Oriented to Person] : oriented to person [Oriented to Place] : oriented to place [de-identified] : AOX3, NAD  [de-identified] : Deferred ankle ROM to the right secondary to laceration [de-identified] : Right lateral ankle wound- dehiscence noted to the lateral wound down to fat - healed with no edema, no erythema,  no drainage, no proximal streaking, no signs of infection ; right heel wound- healed  [FreeTextEntry2] : 0.2 [FreeTextEntry1] : Right Lateral Ankle [FreeTextEntry3] : 1.5 [FreeTextEntry4] : 0.1 [de-identified] : none [de-identified] : traumatic [de-identified] : none [de-identified] : intact [de-identified] : none [de-identified] : none [de-identified] : none [de-identified] : none [de-identified] : Olga [de-identified] : Mechanically cleansed with sterile gauze and normal saline 0.9% Dry Dressing  Scabbed over [TWNoteComboBox4] : None [de-identified] : Every other day [de-identified] : Primary Dressing

## 2024-08-11 NOTE — HISTORY OF PRESENT ILLNESS
[FreeTextEntry1] : Patient is 33 year old male presenting for f/u s/p traumatic wound to the right lateral ankle down to fat - healed  and also lateral heel wound down to fat - healed with no signs of infection. Patient has returned to work and denies any pain or any other complaints.

## 2024-08-11 NOTE — REVIEW OF SYSTEMS
[Skin Wound] : skin wound [Negative] : Endocrine [de-identified] : Right lateral heel and ankle wound down to fat

## 2024-08-12 DIAGNOSIS — Z88.8 ALLERGY STATUS TO OTHER DRUGS, MEDICAMENTS AND BIOLOGICAL SUBSTANCES: ICD-10-CM

## 2024-08-12 DIAGNOSIS — T81.33XD DISRUPTION OF TRAUMATIC INJURY WOUND REPAIR, SUBSEQUENT ENCOUNTER: ICD-10-CM
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
## 2024-08-12 DIAGNOSIS — Z83.3 FAMILY HISTORY OF DIABETES MELLITUS: ICD-10-CM

## 2024-08-12 DIAGNOSIS — Z87.39 PERSONAL HISTORY OF OTHER DISEASES OF THE MUSCULOSKELETAL SYSTEM AND CONNECTIVE TISSUE: ICD-10-CM
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
## 2024-08-12 DIAGNOSIS — Z90.49 ACQUIRED ABSENCE OF OTHER SPECIFIED PARTS OF DIGESTIVE TRACT: ICD-10-CM

## 2024-08-12 DIAGNOSIS — Z88.0 ALLERGY STATUS TO PENICILLIN: ICD-10-CM

## 2025-02-07 NOTE — PLAN
Monitored by specialist- no acute findings meriting change in the plan          [FreeTextEntry1] : .Removed all sutures to  skin flap not co -opted. Dehiscence noted to the wound down to fat. C/w local wound care and offloading.  RTC in one week. Spent 20 minutes for patient care and medical decision making.